# Patient Record
Sex: FEMALE | Race: BLACK OR AFRICAN AMERICAN | NOT HISPANIC OR LATINO | Employment: STUDENT | URBAN - METROPOLITAN AREA
[De-identification: names, ages, dates, MRNs, and addresses within clinical notes are randomized per-mention and may not be internally consistent; named-entity substitution may affect disease eponyms.]

---
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
## 2022-03-11 ENCOUNTER — OFFICE VISIT (OUTPATIENT)
Dept: INTERNAL MEDICINE | Facility: CLINIC | Age: 22
End: 2022-03-11
Payer: COMMERCIAL

## 2022-03-11 VITALS
OXYGEN SATURATION: 100 % | DIASTOLIC BLOOD PRESSURE: 71 MMHG | HEART RATE: 89 BPM | BODY MASS INDEX: 21.47 KG/M2 | HEIGHT: 66 IN | TEMPERATURE: 98 F | WEIGHT: 133.63 LBS | SYSTOLIC BLOOD PRESSURE: 119 MMHG

## 2022-03-11 DIAGNOSIS — R05.9 COUGH: Primary | ICD-10-CM

## 2022-03-11 DIAGNOSIS — J32.9 SINUSITIS, UNSPECIFIED CHRONICITY, UNSPECIFIED LOCATION: ICD-10-CM

## 2022-03-11 PROCEDURE — 3074F PR MOST RECENT SYSTOLIC BLOOD PRESSURE < 130 MM HG: ICD-10-PCS | Mod: CPTII,S$GLB,, | Performed by: FAMILY MEDICINE

## 2022-03-11 PROCEDURE — 3078F DIAST BP <80 MM HG: CPT | Mod: CPTII,S$GLB,, | Performed by: FAMILY MEDICINE

## 2022-03-11 PROCEDURE — 1159F PR MEDICATION LIST DOCUMENTED IN MEDICAL RECORD: ICD-10-PCS | Mod: CPTII,S$GLB,, | Performed by: FAMILY MEDICINE

## 2022-03-11 PROCEDURE — 99213 PR OFFICE/OUTPT VISIT, EST, LEVL III, 20-29 MIN: ICD-10-PCS | Mod: S$GLB,,, | Performed by: FAMILY MEDICINE

## 2022-03-11 PROCEDURE — 99999 PR PBB SHADOW E&M-EST. PATIENT-LVL IV: CPT | Mod: PBBFAC,,, | Performed by: FAMILY MEDICINE

## 2022-03-11 PROCEDURE — 3008F PR BODY MASS INDEX (BMI) DOCUMENTED: ICD-10-PCS | Mod: CPTII,S$GLB,, | Performed by: FAMILY MEDICINE

## 2022-03-11 PROCEDURE — 3074F SYST BP LT 130 MM HG: CPT | Mod: CPTII,S$GLB,, | Performed by: FAMILY MEDICINE

## 2022-03-11 PROCEDURE — 99999 PR PBB SHADOW E&M-EST. PATIENT-LVL IV: ICD-10-PCS | Mod: PBBFAC,,, | Performed by: FAMILY MEDICINE

## 2022-03-11 PROCEDURE — 3078F PR MOST RECENT DIASTOLIC BLOOD PRESSURE < 80 MM HG: ICD-10-PCS | Mod: CPTII,S$GLB,, | Performed by: FAMILY MEDICINE

## 2022-03-11 PROCEDURE — 1159F MED LIST DOCD IN RCRD: CPT | Mod: CPTII,S$GLB,, | Performed by: FAMILY MEDICINE

## 2022-03-11 PROCEDURE — 3008F BODY MASS INDEX DOCD: CPT | Mod: CPTII,S$GLB,, | Performed by: FAMILY MEDICINE

## 2022-03-11 PROCEDURE — 99213 OFFICE O/P EST LOW 20 MIN: CPT | Mod: S$GLB,,, | Performed by: FAMILY MEDICINE

## 2022-03-11 RX ORDER — CODEINE PHOSPHATE AND GUAIFENESIN 10; 100 MG/5ML; MG/5ML
5 SOLUTION ORAL 3 TIMES DAILY PRN
Qty: 120 ML | Refills: 0 | Status: SHIPPED | OUTPATIENT
Start: 2022-03-11 | End: 2022-03-21

## 2022-03-11 RX ORDER — BENZONATATE 200 MG/1
200 CAPSULE ORAL 3 TIMES DAILY PRN
Qty: 30 CAPSULE | Refills: 1 | Status: SHIPPED | OUTPATIENT
Start: 2022-03-11 | End: 2022-03-21

## 2022-03-11 RX ORDER — AMOXICILLIN AND CLAVULANATE POTASSIUM 875; 125 MG/1; MG/1
1 TABLET, FILM COATED ORAL EVERY 12 HOURS
Qty: 20 TABLET | Refills: 0 | Status: SHIPPED | OUTPATIENT
Start: 2022-03-11 | End: 2022-08-05

## 2022-03-12 NOTE — PROGRESS NOTES
"Subjective:      Patient ID: Anson Guan is a 22 y.o. female.    Chief Complaint: Cough and Nasal Congestion      Patient reports dry cough, nasal congestion , pnd, throat irritation for about 9 days now. Did have negative covid test.     Cough  Associated symptoms include postnasal drip and rhinorrhea. Pertinent negatives include no fever.     Review of Systems   Constitutional: Negative for activity change, appetite change and fever.   HENT: Positive for congestion, postnasal drip, rhinorrhea and sinus pressure.    Respiratory: Positive for cough.      History reviewed. No pertinent past medical history.       History reviewed. No pertinent surgical history.  Family History   Problem Relation Age of Onset    No Known Problems Mother     Hypertension Father     Hypertension Maternal Grandmother     Hypertension Maternal Grandfather      Social History     Socioeconomic History    Marital status: Single   Tobacco Use    Smoking status: Never Smoker    Smokeless tobacco: Never Used   Substance and Sexual Activity    Alcohol use: Yes     Alcohol/week: 1.0 standard drink     Types: 1 Glasses of wine per week    Drug use: Never    Sexual activity: Not Currently     Review of patient's allergies indicates:  No Known Allergies    Objective:       /71 (BP Location: Left arm, Patient Position: Sitting, BP Method: Large (Automatic))   Pulse 89   Temp 98.1 °F (36.7 °C) (Tympanic)   Ht 5' 6" (1.676 m)   Wt 60.6 kg (133 lb 9.6 oz)   SpO2 100%   BMI 21.56 kg/m²   Physical Exam  Vitals and nursing note reviewed.   Constitutional:       General: She is not in acute distress.     Appearance: She is well-developed. She is not diaphoretic.   HENT:      Head: Normocephalic.      Right Ear: Hearing, tympanic membrane, ear canal and external ear normal.      Left Ear: Hearing, tympanic membrane, ear canal and external ear normal.      Nose: Mucosal edema present.      Right Sinus: Maxillary sinus tenderness and " frontal sinus tenderness present.      Left Sinus: Maxillary sinus tenderness and frontal sinus tenderness present.      Mouth/Throat:      Pharynx: Uvula midline. Posterior oropharyngeal erythema present.   Eyes:      Conjunctiva/sclera: Conjunctivae normal.      Pupils: Pupils are equal, round, and reactive to light.   Cardiovascular:      Rate and Rhythm: Normal rate and regular rhythm.      Heart sounds: Normal heart sounds.   Pulmonary:      Effort: Pulmonary effort is normal. No respiratory distress.      Breath sounds: Normal breath sounds.   Abdominal:      General: Bowel sounds are normal.      Palpations: Abdomen is soft.   Lymphadenopathy:      Cervical: No cervical adenopathy.   Skin:     General: Skin is warm and dry.      Capillary Refill: Capillary refill takes less than 2 seconds.   Psychiatric:         Behavior: Behavior normal.       Assessment:     1. Cough    2. Sinusitis, unspecified chronicity, unspecified location      Plan:   Cough    Sinusitis, unspecified chronicity, unspecified location    Other orders  -     amoxicillin-clavulanate 875-125mg (AUGMENTIN) 875-125 mg per tablet; Take 1 tablet by mouth every 12 (twelve) hours.  Dispense: 20 tablet; Refill: 0  -     benzonatate (TESSALON) 200 MG capsule; Take 1 capsule (200 mg total) by mouth 3 (three) times daily as needed.  Dispense: 30 capsule; Refill: 1  -     guaiFENesin-codeine 100-10 mg/5 ml (TUSSI-ORGANIDIN NR)  mg/5 mL syrup; Take 5 mLs by mouth 3 (three) times daily as needed for Cough.  Dispense: 120 mL; Refill: 0      Medication List with Changes/Refills   New Medications    AMOXICILLIN-CLAVULANATE 875-125MG (AUGMENTIN) 875-125 MG PER TABLET    Take 1 tablet by mouth every 12 (twelve) hours.    BENZONATATE (TESSALON) 200 MG CAPSULE    Take 1 capsule (200 mg total) by mouth 3 (three) times daily as needed.    GUAIFENESIN-CODEINE 100-10 MG/5 ML (TUSSI-ORGANIDIN NR)  MG/5 ML SYRUP    Take 5 mLs by mouth 3 (three) times  daily as needed for Cough.   Current Medications    CLINDAMYCIN PHOSPHATE 1% (CLINDAGEL) 1 % GEL    clindamycin 1 % topical gel   PAULA THINLY TO THE FACE QAM FOR SPOT TREATMENT PRN    FLUTICASONE PROPIONATE (FLONASE) 50 MCG/ACTUATION NASAL SPRAY    50 sprays by Each Nostril route daily as needed.    HYDROCORTISONE 2.5 % OINTMENT    APPLY AT LOWER CHEEKS TWICE DAILY ON TUESDAY AND THURSDAY ONLY AS NEEDED FOR FLARES    TRETINOIN (RETIN-A) 0.025 % CREAM    tretinoin 0.025 % topical cream   WASH FACE AND APPLY THIN LAYER TO FACE QHS. SKIP A NIGHT IF FACE TOO DRY.   Discontinued Medications    NYSTATIN (MYCOSTATIN) CREAM    Apply topically 2 (two) times daily.

## 2022-08-05 ENCOUNTER — OFFICE VISIT (OUTPATIENT)
Dept: INTERNAL MEDICINE | Facility: CLINIC | Age: 22
End: 2022-08-05
Payer: COMMERCIAL

## 2022-08-05 VITALS
TEMPERATURE: 98 F | HEIGHT: 66 IN | RESPIRATION RATE: 14 BRPM | SYSTOLIC BLOOD PRESSURE: 118 MMHG | HEART RATE: 75 BPM | OXYGEN SATURATION: 100 % | DIASTOLIC BLOOD PRESSURE: 60 MMHG | WEIGHT: 136.25 LBS | BODY MASS INDEX: 21.9 KG/M2

## 2022-08-05 DIAGNOSIS — B35.3 TINEA PEDIS OF RIGHT FOOT: ICD-10-CM

## 2022-08-05 DIAGNOSIS — K21.9 GASTROESOPHAGEAL REFLUX DISEASE, UNSPECIFIED WHETHER ESOPHAGITIS PRESENT: ICD-10-CM

## 2022-08-05 DIAGNOSIS — Z23 NEED FOR MENINGITIS VACCINATION: Primary | ICD-10-CM

## 2022-08-05 PROCEDURE — 99999 PR PBB SHADOW E&M-EST. PATIENT-LVL IV: ICD-10-PCS | Mod: PBBFAC,,, | Performed by: FAMILY MEDICINE

## 2022-08-05 PROCEDURE — 3008F PR BODY MASS INDEX (BMI) DOCUMENTED: ICD-10-PCS | Mod: CPTII,S$GLB,, | Performed by: FAMILY MEDICINE

## 2022-08-05 PROCEDURE — 90471 IMMUNIZATION ADMIN: CPT | Mod: S$GLB,,, | Performed by: FAMILY MEDICINE

## 2022-08-05 PROCEDURE — 99999 PR PBB SHADOW E&M-EST. PATIENT-LVL IV: CPT | Mod: PBBFAC,,, | Performed by: FAMILY MEDICINE

## 2022-08-05 PROCEDURE — 3074F PR MOST RECENT SYSTOLIC BLOOD PRESSURE < 130 MM HG: ICD-10-PCS | Mod: CPTII,S$GLB,, | Performed by: FAMILY MEDICINE

## 2022-08-05 PROCEDURE — 90620 MENINGOCOCCAL B, OMV VACCINE: ICD-10-PCS | Mod: S$GLB,,, | Performed by: FAMILY MEDICINE

## 2022-08-05 PROCEDURE — 1159F MED LIST DOCD IN RCRD: CPT | Mod: CPTII,S$GLB,, | Performed by: FAMILY MEDICINE

## 2022-08-05 PROCEDURE — 90471 MENINGOCOCCAL B, OMV VACCINE: ICD-10-PCS | Mod: S$GLB,,, | Performed by: FAMILY MEDICINE

## 2022-08-05 PROCEDURE — 99214 PR OFFICE/OUTPT VISIT, EST, LEVL IV, 30-39 MIN: ICD-10-PCS | Mod: 25,S$GLB,, | Performed by: FAMILY MEDICINE

## 2022-08-05 PROCEDURE — 3078F DIAST BP <80 MM HG: CPT | Mod: CPTII,S$GLB,, | Performed by: FAMILY MEDICINE

## 2022-08-05 PROCEDURE — 90620 MENB-4C VACCINE IM: CPT | Mod: S$GLB,,, | Performed by: FAMILY MEDICINE

## 2022-08-05 PROCEDURE — 99214 OFFICE O/P EST MOD 30 MIN: CPT | Mod: 25,S$GLB,, | Performed by: FAMILY MEDICINE

## 2022-08-05 PROCEDURE — 3074F SYST BP LT 130 MM HG: CPT | Mod: CPTII,S$GLB,, | Performed by: FAMILY MEDICINE

## 2022-08-05 PROCEDURE — 3008F BODY MASS INDEX DOCD: CPT | Mod: CPTII,S$GLB,, | Performed by: FAMILY MEDICINE

## 2022-08-05 PROCEDURE — 1159F PR MEDICATION LIST DOCUMENTED IN MEDICAL RECORD: ICD-10-PCS | Mod: CPTII,S$GLB,, | Performed by: FAMILY MEDICINE

## 2022-08-05 PROCEDURE — 3078F PR MOST RECENT DIASTOLIC BLOOD PRESSURE < 80 MM HG: ICD-10-PCS | Mod: CPTII,S$GLB,, | Performed by: FAMILY MEDICINE

## 2022-08-05 RX ORDER — PANTOPRAZOLE SODIUM 40 MG/1
40 TABLET, DELAYED RELEASE ORAL DAILY
Qty: 30 TABLET | Refills: 11 | Status: SHIPPED | OUTPATIENT
Start: 2022-08-05 | End: 2022-12-27

## 2022-08-05 RX ORDER — NYSTATIN 100000 U/G
CREAM TOPICAL 2 TIMES DAILY
Qty: 30 G | Refills: 5 | Status: SHIPPED | OUTPATIENT
Start: 2022-08-05 | End: 2022-12-27

## 2022-08-05 NOTE — PROGRESS NOTES
. Administered Meningococcal B Vaccine into right deltoid. Patient tolerated well, no adverse reaction noted. Advise 15 min wait. Pt voiced understanding.

## 2022-08-07 NOTE — PROGRESS NOTES
"Subjective:      Patient ID: Anson Guan is a 22 y.o. female.    Chief Complaint: Follow-up      Patient here for vaccine update - needs second bexsero.  Reports tinea on right heel had almost resolved but then returned when she ran out of her prescription.   Dentist told her her teeth show signs of acid reflux    Follow-up  The current episode started yesterday. Pertinent negatives include no abdominal pain or nausea.     Review of Systems   Constitutional: Negative for activity change and appetite change.   Cardiovascular: Negative for leg swelling.   Gastrointestinal: Negative for abdominal pain and nausea.   Skin: Negative for color change.     No past medical history on file.       No past surgical history on file.  Family History   Problem Relation Age of Onset    No Known Problems Mother     Hypertension Father     Hypertension Maternal Grandmother     Hypertension Maternal Grandfather      Social History     Socioeconomic History    Marital status: Single   Tobacco Use    Smoking status: Never Smoker    Smokeless tobacco: Never Used   Substance and Sexual Activity    Alcohol use: Yes     Alcohol/week: 1.0 standard drink     Types: 1 Glasses of wine per week    Drug use: Never    Sexual activity: Not Currently     Review of patient's allergies indicates:  No Known Allergies    Objective:       /60 (BP Location: Right arm, Patient Position: Sitting, BP Method: Medium (Automatic))   Pulse 75   Temp 98.1 °F (36.7 °C) (Temporal)   Resp 14   Ht 5' 6" (1.676 m)   Wt 61.8 kg (136 lb 3.9 oz)   SpO2 100%   BMI 21.99 kg/m²   Physical Exam  Constitutional:       General: She is not in acute distress.     Appearance: Normal appearance. She is well-developed. She is not ill-appearing or diaphoretic.   Cardiovascular:      Rate and Rhythm: Normal rate and regular rhythm.      Heart sounds: Normal heart sounds.   Pulmonary:      Effort: Pulmonary effort is normal.      Breath sounds: Normal breath " sounds.   Skin:     Comments: Cracked , peeling skin on right heel   Neurological:      Mental Status: She is alert and oriented to person, place, and time.   Psychiatric:         Mood and Affect: Mood normal.         Behavior: Behavior normal.         Thought Content: Thought content normal.         Judgment: Judgment normal.       Assessment:     1. Need for meningitis vaccination    2. Tinea pedis of right foot    3. Gastroesophageal reflux disease, unspecified whether esophagitis present      Plan:   Need for meningitis vaccination    Tinea pedis of right foot    Gastroesophageal reflux disease, unspecified whether esophagitis present  -     pantoprazole (PROTONIX) 40 MG tablet; Take 1 tablet (40 mg total) by mouth once daily.  Dispense: 30 tablet; Refill: 11    Other orders  -     (In Office Administered) Meningococcal B, OMV Vaccine (BEXSERO)  -     nystatin (MYCOSTATIN) cream; Apply topically 2 (two) times daily.  Dispense: 30 g; Refill: 5      Medication List with Changes/Refills   New Medications    PANTOPRAZOLE (PROTONIX) 40 MG TABLET    Take 1 tablet (40 mg total) by mouth once daily.   Current Medications    CLINDAMYCIN PHOSPHATE 1% (CLINDAGEL) 1 % GEL    clindamycin 1 % topical gel   PAULA THINLY TO THE FACE QAM FOR SPOT TREATMENT PRN    FLUTICASONE PROPIONATE (FLONASE) 50 MCG/ACTUATION NASAL SPRAY    50 sprays by Each Nostril route daily as needed.    HYDROCORTISONE 2.5 % OINTMENT    APPLY AT LOWER CHEEKS TWICE DAILY ON TUESDAY AND THURSDAY ONLY AS NEEDED FOR FLARES    TRETINOIN (RETIN-A) 0.025 % CREAM    tretinoin 0.025 % topical cream   WASH FACE AND APPLY THIN LAYER TO FACE QHS. SKIP A NIGHT IF FACE TOO DRY.   Changed and/or Refilled Medications    Modified Medication Previous Medication    NYSTATIN (MYCOSTATIN) CREAM nystatin (MYCOSTATIN) cream       Apply topically 2 (two) times daily.    Apply topically 2 (two) times daily.   Discontinued Medications    AMOXICILLIN-CLAVULANATE 875-125MG (AUGMENTIN)  875-125 MG PER TABLET    Take 1 tablet by mouth every 12 (twelve) hours.

## 2022-12-21 ENCOUNTER — CLINICAL SUPPORT (OUTPATIENT)
Dept: INTERNAL MEDICINE | Facility: CLINIC | Age: 22
End: 2022-12-21
Payer: COMMERCIAL

## 2022-12-21 DIAGNOSIS — Z02.0 SCHOOL PHYSICAL EXAM: Primary | ICD-10-CM

## 2022-12-21 DIAGNOSIS — Z00.00 HEALTH CARE MAINTENANCE: Primary | ICD-10-CM

## 2022-12-21 PROCEDURE — 99999 PR PBB SHADOW E&M-EST. PATIENT-LVL II: CPT | Mod: PBBFAC,,,

## 2022-12-21 PROCEDURE — 86580 TB INTRADERMAL TEST: CPT | Mod: S$GLB,,, | Performed by: FAMILY MEDICINE

## 2022-12-21 PROCEDURE — 86580 POCT TB SKIN TEST: ICD-10-PCS | Mod: S$GLB,,, | Performed by: FAMILY MEDICINE

## 2022-12-21 PROCEDURE — 99999 PR PBB SHADOW E&M-EST. PATIENT-LVL II: ICD-10-PCS | Mod: PBBFAC,,,

## 2022-12-21 NOTE — PROGRESS NOTES
PPD 0.1 ml given in left forearm   Lot #: [id number]   Exp: 12/30/23  Manufactory: J[employer]AN  NDC #: 60739-713-73    Pt waited 15 minutes without a reaction notices and advices to return back to clinic for reading on 12/23/22  Pt verbalized understanding     Patient spoke with physician in regards to his concerns

## 2022-12-23 ENCOUNTER — CLINICAL SUPPORT (OUTPATIENT)
Dept: INTERNAL MEDICINE | Facility: CLINIC | Age: 22
End: 2022-12-23
Payer: COMMERCIAL

## 2022-12-23 DIAGNOSIS — Z00.00 HEALTH CARE MAINTENANCE: Primary | ICD-10-CM

## 2022-12-27 ENCOUNTER — OFFICE VISIT (OUTPATIENT)
Dept: INTERNAL MEDICINE | Facility: CLINIC | Age: 22
End: 2022-12-27
Payer: COMMERCIAL

## 2022-12-27 DIAGNOSIS — Z00.00 ROUTINE GENERAL MEDICAL EXAMINATION AT A HEALTH CARE FACILITY: Primary | ICD-10-CM

## 2022-12-27 DIAGNOSIS — L70.0 ACNE VULGARIS: ICD-10-CM

## 2022-12-27 PROCEDURE — 99395 PREV VISIT EST AGE 18-39: CPT | Mod: S$GLB,,, | Performed by: INTERNAL MEDICINE

## 2022-12-27 PROCEDURE — 99999 PR PBB SHADOW E&M-EST. PATIENT-LVL II: CPT | Mod: PBBFAC,,, | Performed by: INTERNAL MEDICINE

## 2022-12-27 PROCEDURE — 99395 PR PREVENTIVE VISIT,EST,18-39: ICD-10-PCS | Mod: S$GLB,,, | Performed by: INTERNAL MEDICINE

## 2022-12-27 PROCEDURE — 1159F PR MEDICATION LIST DOCUMENTED IN MEDICAL RECORD: ICD-10-PCS | Mod: CPTII,S$GLB,, | Performed by: INTERNAL MEDICINE

## 2022-12-27 PROCEDURE — 1159F MED LIST DOCD IN RCRD: CPT | Mod: CPTII,S$GLB,, | Performed by: INTERNAL MEDICINE

## 2022-12-27 PROCEDURE — 99999 PR PBB SHADOW E&M-EST. PATIENT-LVL II: ICD-10-PCS | Mod: PBBFAC,,, | Performed by: INTERNAL MEDICINE

## 2022-12-27 RX ORDER — CLINDAMYCIN PHOSPHATE 10 MG/G
GEL TOPICAL
Qty: 30 G | Refills: 1 | Status: SHIPPED | OUTPATIENT
Start: 2022-12-27 | End: 2023-02-27 | Stop reason: SDUPTHER

## 2022-12-27 RX ORDER — TRETINOIN 0.25 MG/G
CREAM TOPICAL
Qty: 45 G | Refills: 0 | Status: SHIPPED | OUTPATIENT
Start: 2022-12-27

## 2022-12-27 NOTE — PROGRESS NOTES
HPI:  Patient is a 22-year-old female who comes in today needing updated immunizations and her annual physical.  She is requesting refills of her acne meds.  She has no new problems or complaints.    Current MEDS: medcard review, verified and update  Allergies: Per the electronic medical record    History reviewed. No pertinent past medical history.    History reviewed. No pertinent surgical history.    SHx: per the electronic medical record    FHx: recorded in the electronic medical record    ROS:    denies any chest pains or shortness of breath. Denies any nausea, vomiting or diarrhea. Denies any fever, chills or sweats. Denies any change in weight, voice, stool, skin or hair. Denies any dysuria, dyspepsia or dysphagia. Denies any change in vision, hearing or headaches. Denies any swollen lymph nodes or loss of memory.    PE:  There were no vitals taken for this visit.  Gen: Well-developed, well-nourished, female, in no acute distress, oriented x3  HEENT: neck is supple, no adenopathy, carotids 2+ equal without bruits, thyroid exam normal size without nodules.  CHEST: clear to auscultation and percussion  CVS: regular rate and rhythm without significant murmur, gallop, or rubs  ABD: soft, benign, no rebound no guarding, no distention. Bowel sounds are normal.     Nontender,  no palpable masses, no organomegaly and no audible bruits.  BREAST:  Deferred.  EXT: no clubbing, cyanosis, or edema  LYMPH: no cervical, inguinal, or axillary adenopathy  FEET: no loss of sensation.  No ulcers or pressure sores.  NEURO: gait normal.  Cranial nerves II- XII intact. No nystagmus.  Speech normal.   Gross motor and sensory unremarkable.  PELVIC: deferred    Lab Results   Component Value Date    WBC 7.49 06/08/2021    HGB 13.3 06/08/2021    HCT 43.3 06/08/2021     06/08/2021    CHOL 186 06/08/2021    TRIG 75 06/08/2021    HDL 51 06/08/2021    ALT 11 06/08/2021    AST 17 06/08/2021     06/08/2021    K 4.5 06/08/2021      06/08/2021    CREATININE 1.1 06/08/2021    BUN 20 06/08/2021    CO2 23 06/08/2021       Impression:  Healthy 22-year-old female    Plan:   Orders Placed This Encounter    (In Office Administered) Tdap Vaccine    clindamycin phosphate 1% (CLINDAGEL) 1 % gel    tretinoin (RETIN-A) 0.025 % cream     Patient will he Tdap vaccine.  She will be given refills of the clindamycin and Retin-A associations were updated.    This note is generated with speech recognition software and is subject to transcription error and sound alike phrases that may be missed by proofreading.

## 2022-12-28 ENCOUNTER — TELEPHONE (OUTPATIENT)
Dept: INTERNAL MEDICINE | Facility: CLINIC | Age: 22
End: 2022-12-28
Payer: COMMERCIAL

## 2022-12-28 NOTE — TELEPHONE ENCOUNTER
----- Message from Makenzie Strange sent at 12/27/2022  2:50 PM CST -----  Contact: [phone]  Patient is trying to make sure she can get her TDAP 12/28/22.Please call patient to advise [phone].Thanks

## 2022-12-30 ENCOUNTER — CLINICAL SUPPORT (OUTPATIENT)
Dept: INTERNAL MEDICINE | Facility: CLINIC | Age: 22
End: 2022-12-30
Payer: COMMERCIAL

## 2022-12-30 PROCEDURE — 99999 PR PBB SHADOW E&M-EST. PATIENT-LVL II: ICD-10-PCS | Mod: PBBFAC,,,

## 2022-12-30 PROCEDURE — 90715 TDAP VACCINE GREATER THAN OR EQUAL TO 7YO IM: ICD-10-PCS | Mod: S$GLB,,, | Performed by: INTERNAL MEDICINE

## 2022-12-30 PROCEDURE — 90471 TDAP VACCINE GREATER THAN OR EQUAL TO 7YO IM: ICD-10-PCS | Mod: S$GLB,,, | Performed by: INTERNAL MEDICINE

## 2022-12-30 PROCEDURE — 90471 IMMUNIZATION ADMIN: CPT | Mod: S$GLB,,, | Performed by: INTERNAL MEDICINE

## 2022-12-30 PROCEDURE — 90715 TDAP VACCINE 7 YRS/> IM: CPT | Mod: S$GLB,,, | Performed by: INTERNAL MEDICINE

## 2022-12-30 PROCEDURE — 99999 PR PBB SHADOW E&M-EST. PATIENT-LVL II: CPT | Mod: PBBFAC,,,

## 2022-12-30 NOTE — PROGRESS NOTES
Tdap 0.5 ml given IM in left arm   Lot #: [id number]     Exp : 04/14/24  Manufactory : [employer]  NDC #: 82296-535-76    Pt waited 15 minutes without a reaction notices

## 2023-01-12 ENCOUNTER — TELEPHONE (OUTPATIENT)
Dept: INTERNAL MEDICINE | Facility: CLINIC | Age: 23
End: 2023-01-12

## 2023-01-25 ENCOUNTER — PATIENT MESSAGE (OUTPATIENT)
Dept: ADMINISTRATIVE | Facility: HOSPITAL | Age: 23
End: 2023-01-25
Payer: COMMERCIAL

## 2023-02-27 ENCOUNTER — PATIENT MESSAGE (OUTPATIENT)
Dept: INTERNAL MEDICINE | Facility: CLINIC | Age: 23
End: 2023-02-27
Payer: COMMERCIAL

## 2023-02-27 DIAGNOSIS — L70.0 ACNE VULGARIS: ICD-10-CM

## 2023-02-27 RX ORDER — CLINDAMYCIN PHOSPHATE 10 MG/G
GEL TOPICAL
Qty: 30 G | Refills: 1 | Status: SHIPPED | OUTPATIENT
Start: 2023-02-27 | End: 2024-03-18 | Stop reason: SDUPTHER

## 2023-02-27 RX ORDER — HYDROCORTISONE 25 MG/G
OINTMENT TOPICAL 2 TIMES DAILY
Qty: 60 G | Refills: 3 | Status: SHIPPED | OUTPATIENT
Start: 2023-02-27

## 2023-02-27 NOTE — TELEPHONE ENCOUNTER
Please see patient's mychart message and advise. Pt requesting refills on her clindamycin phosphate and hydrocortisone.     /27/2022 with   08/05/2022 with Dr.Brignac COOK none scheduled  LF 12/27/2022

## 2023-02-27 NOTE — TELEPHONE ENCOUNTER
No new care gaps identified.  [facility] Embedded Care Gaps. Reference number: [id number]. 2/27/2023   10:20:06 AM CST

## 2023-04-19 ENCOUNTER — E-VISIT (OUTPATIENT)
Dept: INTERNAL MEDICINE | Facility: CLINIC | Age: 23
End: 2023-04-19
Payer: COMMERCIAL

## 2023-04-19 DIAGNOSIS — J06.9 UPPER RESPIRATORY TRACT INFECTION, UNSPECIFIED TYPE: Primary | ICD-10-CM

## 2023-04-19 PROCEDURE — 99421 OL DIG E/M SVC 5-10 MIN: CPT | Mod: 95,,, | Performed by: FAMILY MEDICINE

## 2023-04-19 PROCEDURE — 99421 PR E&M, ONLINE DIGIT, EST, < 7 DAYS, 5-10 MINS: ICD-10-PCS | Mod: 95,,, | Performed by: FAMILY MEDICINE

## 2023-04-20 RX ORDER — PREDNISONE 20 MG/1
40 TABLET ORAL DAILY
Qty: 8 TABLET | Refills: 0 | Status: SHIPPED | OUTPATIENT
Start: 2023-04-20 | End: 2023-04-24

## 2023-04-20 RX ORDER — BENZONATATE 200 MG/1
200 CAPSULE ORAL 3 TIMES DAILY PRN
Qty: 30 CAPSULE | Refills: 1 | Status: SHIPPED | OUTPATIENT
Start: 2023-04-20 | End: 2023-04-30

## 2023-04-20 RX ORDER — AZELASTINE 1 MG/ML
1 SPRAY, METERED NASAL 2 TIMES DAILY
Qty: 30 ML | Refills: 1 | Status: SHIPPED | OUTPATIENT
Start: 2023-04-20 | End: 2024-04-19

## 2023-04-20 NOTE — PROGRESS NOTES
Patient ID: Anson Guan is a 23 y.o. female.    Chief Complaint: Nasal Congestion    The patient initiated a request through [employer] on 4/19/2023 for evaluation and management with a chief complaint of Nasal Congestion     I evaluated the questionnaire submission on 4/20/23.    [facility] Peq Evisit Upper Respitatory/Cough Questionnaire    4/19/2023  3:12 PM CDT - Filed by Patient   Do you agree to participate in an E-Visit? Yes   If you have any of the following symptoms, please present to your local ER or call 911:  I acknowledge   What is the main issue that you would like for your doctor to address today? Cough & congestion. Little pressure in my face. When laying down, I am the most congested. Babysat a toddler over break who had a cough & slept in bed with me, symptoms started about 2 days after that.I have a history of sinus allergies.   Are you able to take your vital signs? No   Are you currently pregnant, could you be pregnant, or are you breast feeding? None of the above   What symptoms do you currently have?  Cough;  Nasal Congestion   Have you had a fever? No   When did your symptoms first appear? 4/16/2023   In the last two weeks, have you been in close contact with someone who has COVID-19 or the Flu? No   In the last two weeks, have you worked or volunteered in a healthcare facility or as a ? Healthcare facilities include a hospital, medical or dental clinic, long-term care facility, or nursing home No   Do you live in a long-term care facility, nursing home, group home, or homeless shelter? No   List what you have done or taken to help your symptoms. Took [facility] cold & flu once but I have been studying for final exams, I didn't want to be drowsy from meds   How severe are your symptoms? Mild   Have you taken an at home Covid test? No   Have you taken a Flu test? No   Have you been fully vaccinated for COVID? (2 Pfizer, 2 Moderna or 1 Frantz & Frantz vaccine injections) Yes    Have you received a booster? Yes   Have you recieved a Flu shot? Yes   When did you recieve your Flu shot? 12/29/2022   Do you have transportation to get tested for COVID if it is indicated and ordered for you at an [facility] location? Yes   Provide any information you feel is important to your history not asked above    Please attach any relevant images or files          Recent Labs Obtained:  No visits with results within 7 Day(s) from this visit.   Latest known visit with results is:   Lab Visit on 12/10/2021   Component Date Value Ref Range Status    Rubella IgG Antibodies 12/10/2021 28.2  >=10.0 IU/mL Final    Rubella Immune Status 12/10/2021 Reactive   Final    Comment: 0.0-4.9 IU/mL  Nonreactive (Non-Immune)  5.0-9.9 IU/mL  Indeterminate  10.0-400.0 IU/mL Reactive (Immune)    These results were obtained with the IMMULITE 2000 Rubella  Quantitative IgG assay. Values obtained from other   manufacturers' assay methods may not be used interchangeably.       Rubeola IgG 12/10/2021 2.41 (H)  0.00 - 0.90 ISR Final    Rubeola Interpretation 12/10/2021 Positive (A)  Negative Final    Comment: <or=0.90    Negative         No detectable IgG antibody to Rubeola/Measles by the MARITZA   test. Such individuals are presumed to be uninfected with   rubeola and to be susceptible to primary infection.    0.91-1.09   Equivocal    >or=1.10    Positive   Indicates presence of detectable IgG antibody to   Rubeola/Measles by the MARITZA test. Indicative of current or   previous infection.      Mumps IgG Screen 12/10/2021 2.82 (H)  0.00 - 0.90 ISR Final    Mumps IgG Interpretation 12/10/2021 Positive (A)  Negative Final    Comment: <or=0.90     Negative            No detectable IgG antibody to Mumps by the MARITZA test. Such   individuals are presumed to be uninfected with Mumps and to be   susceptible to primary infection.    0.91-1.09    Equivocal    >or=1.10     Positive            Indicates presence of detectable IgG antibody to Mumps  by   the MARITZA test. Indicative of current or previous infection.      Hep. B Surf Ab, Qual 12/10/2021 Negative   Final    Comment: Expected Values  Unvaccinated:    Negative  Vaccinated:      Positive      Test performed at [facility],  [address] , [place]  [address]     [phone]  Tristan Velez MD  - Medical Director      Hep. B Surf Ab, Quant. 12/10/2021 <3  mIU/mL Final    Comment: Expected Values:  Unvaccinated:     Less than 10 mIU/mL  Vaccinated:       Greater than or equal to 10 mIU/mL      Varicella Zoster IgG 12/10/2021 2.70 (H)  0.00 - 0.90 ISR Final    Varicella Interpretation 12/10/2021 Positive (A)  Negative Final    Comment: <or=0.8    Negative        No detectable IgG antibody to Varicella zoster  by the MARITZA test. Such individuals are presumed to be   uninfected with Varicella zoster and to be susceptible to   primary infection.    0.9-1.0    Equivocal    >or=1.1    Positive        Indicates presence of detectable IgG antibody to Varicella   zoster by the MARITZA test. Indicative of previous or current   infection.         Encounter Diagnosis   Name Primary?    Upper respiratory tract infection, unspecified type Yes        No orders of the defined types were placed in this encounter.     Medications Ordered This Encounter   Medications    azelastine (ASTELIN) 137 mcg (0.1 %) nasal spray     Si spray (137 mcg total) by Nasal route 2 (two) times daily.     Dispense:  30 mL     Refill:  1    benzonatate (TESSALON) 200 MG capsule     Sig: Take 1 capsule (200 mg total) by mouth 3 (three) times daily as needed for Cough.     Dispense:  30 capsule     Refill:  1    predniSONE (DELTASONE) 20 MG tablet     Sig: Take 2 tablets (40 mg total) by mouth once daily. for 4 days     Dispense:  8 tablet     Refill:  0        No follow-ups on file.      E-Visit Time Trackin minutes

## 2023-08-17 ENCOUNTER — PATIENT MESSAGE (OUTPATIENT)
Dept: INTERNAL MEDICINE | Facility: CLINIC | Age: 23
End: 2023-08-17
Payer: COMMERCIAL

## 2023-09-20 ENCOUNTER — TELEPHONE (OUTPATIENT)
Dept: INTERNAL MEDICINE | Facility: CLINIC | Age: 23
End: 2023-09-20
Payer: COMMERCIAL

## 2023-09-20 NOTE — TELEPHONE ENCOUNTER
----- Message from Ahsan Bernard sent at 9/20/2023  8:36 AM CDT -----  Contact: Anson  Type:  Needs Medical Advice    Who Called: Anson  Symptoms (please be specific): possible Strep rash   How long has patient had these symptoms:  2 days  Pharmacy name and phone #:    [id number] - [place], [place] - [address] [place] [address]  Phone: [phone] Fax: [phone]  Would the patient rather a call back or a response via [facility]? call  Best Call Back Number: [phone]  Additional Information: Patient reports going to urgent care 2 days ago and wanted to know if there is possible a cream that can be prescribed for possible strep rash.  Thank you,  TH

## 2024-03-18 DIAGNOSIS — L70.0 ACNE VULGARIS: ICD-10-CM

## 2024-03-19 RX ORDER — CLINDAMYCIN PHOSPHATE 10 MG/G
GEL TOPICAL
Qty: 30 G | Refills: 0 | Status: SHIPPED | OUTPATIENT
Start: 2024-03-19

## 2024-08-07 ENCOUNTER — TELEPHONE (OUTPATIENT)
Dept: INTERNAL MEDICINE | Facility: CLINIC | Age: 24
End: 2024-08-07
Payer: COMMERCIAL

## 2025-01-30 ENCOUNTER — OFFICE VISIT (OUTPATIENT)
Dept: INTERNAL MEDICINE | Facility: CLINIC | Age: 25
End: 2025-01-30
Payer: COMMERCIAL

## 2025-01-30 VITALS
WEIGHT: 157.44 LBS | BODY MASS INDEX: 25.41 KG/M2 | DIASTOLIC BLOOD PRESSURE: 66 MMHG | TEMPERATURE: 98 F | SYSTOLIC BLOOD PRESSURE: 118 MMHG | HEART RATE: 98 BPM | OXYGEN SATURATION: 99 %

## 2025-01-30 DIAGNOSIS — J06.9 UPPER RESPIRATORY TRACT INFECTION, UNSPECIFIED TYPE: Primary | ICD-10-CM

## 2025-01-30 LAB
CTP QC/QA: YES
MOLECULAR STREP A: NEGATIVE
POC MOLECULAR INFLUENZA A AGN: NEGATIVE
POC MOLECULAR INFLUENZA B AGN: NEGATIVE
SARS-COV-2 RDRP RESP QL NAA+PROBE: NEGATIVE

## 2025-01-30 PROCEDURE — 1159F MED LIST DOCD IN RCRD: CPT | Mod: CPTII,S$GLB,,

## 2025-01-30 PROCEDURE — 3078F DIAST BP <80 MM HG: CPT | Mod: CPTII,S$GLB,,

## 2025-01-30 PROCEDURE — 87651 STREP A DNA AMP PROBE: CPT | Mod: QW,S$GLB,,

## 2025-01-30 PROCEDURE — 87502 INFLUENZA DNA AMP PROBE: CPT | Mod: QW,S$GLB,,

## 2025-01-30 PROCEDURE — 3074F SYST BP LT 130 MM HG: CPT | Mod: CPTII,S$GLB,,

## 2025-01-30 PROCEDURE — 3008F BODY MASS INDEX DOCD: CPT | Mod: CPTII,S$GLB,,

## 2025-01-30 PROCEDURE — 87635 SARS-COV-2 COVID-19 AMP PRB: CPT | Mod: QW,S$GLB,,

## 2025-01-30 PROCEDURE — 99213 OFFICE O/P EST LOW 20 MIN: CPT | Mod: S$GLB,,,

## 2025-01-30 PROCEDURE — 99999 PR PBB SHADOW E&M-EST. PATIENT-LVL III: CPT | Mod: PBBFAC,,,

## 2025-01-31 NOTE — PROGRESS NOTES
Patient ID: Anson Guan is a 25 y.o. female.    Chief Complaint: Sore Throat    History of Present Illness    CHIEF COMPLAINT:  - Ms. Guan, who is pregnant, presents with sore throat and nasal congestion, seeking evaluation to rule out strep throat.    HPI:  Ms. Guan reports nasal congestion for approximately 2 days, primarily noticeable at nighttime. A sore or scratchy throat started the night before the visit. Ms. Guan has heartburn, which she describes as GERD (Gastroesophageal Reflux Disease). She does not take any medication for the heartburn. Ms. Guan is concerned about the possibility of having strep throat, given her pregnant status.      ROS:  General: -fever, -chills, -fatigue, -weight gain, -weight loss  Eyes: -vision changes, -redness, -discharge  ENT: -ear pain, +nasal congestion, +sore throat  Cardiovascular: -chest pain, -palpitations, -lower extremity edema  Respiratory: -cough, -shortness of breath  Gastrointestinal: -abdominal pain, -nausea, -vomiting, -diarrhea, -constipation, -blood in stool, +heartburn  Genitourinary: -dysuria, -hematuria, -frequency  Musculoskeletal: -joint pain, -muscle pain  Skin: -rash, -lesion  Neurological: -headache, -dizziness, -numbness, -tingling  Psychiatric: -anxiety, -depression, -sleep difficulty         Pmh, Psh, Family Hx, Social Hx updated in Epic Tabs today.         8/5/2022    10:16 AM   Depression Patient Health Questionnaire   Over the last two weeks how often have you been bothered by little interest or pleasure in doing things Not at all   Over the last two weeks how often have you been bothered by feeling down, depressed or hopeless Not at all   PHQ-2 Total Score 0       There are no active problems to display for this patient.      No past medical history on file.    No past surgical history on file.    Family History   Problem Relation Name Age of Onset    Hypertension Maternal Grandmother      Hypertension Maternal Grandfather       Hypertension Father         Social History     Socioeconomic History    Marital status: Single   Tobacco Use    Smoking status: Never    Smokeless tobacco: Never   Substance and Sexual Activity    Alcohol use: Not Currently     Alcohol/week: 1.0 standard drink of alcohol     Types: 1 Glasses of wine per week    Drug use: Never    Sexual activity: Yes       Current Outpatient Medications on File Prior to Visit   Medication Sig Dispense Refill    ferrous sulfate (FEOSOL) 325 mg (65 mg iron) Tab tablet Take 1 tablet (325 mg total) by mouth once daily. 30 tablet 9    prenatal vit no.180/iron/folic (PRENATAL PLUS VITAMIN-MINERAL ORAL) Take 1 tablet by mouth once daily.       No current facility-administered medications on file prior to visit.       Review of patient's allergies indicates:  No Known Allergies    General - Well developed, alert and oriented in NAD  HEENT - normocephalic, no evidence of trauma, sclera white, EOMI  Neck - full range of motion  COR - regular rate and rhythm without murmurs or gallops  Lungs - Clear  Abdomen - soft, non-tender  Ext - no cyanosis or edema     Assessment:     1. Upper respiratory tract infection, unspecified type        Pertinent Labs:    Chemistry        Component Value Date/Time     06/08/2021 1650    K 4.5 06/08/2021 1650     06/08/2021 1650    CO2 23 06/08/2021 1650    BUN 20 06/08/2021 1650    CREATININE 1.1 06/08/2021 1650    GLU 99 06/08/2021 1650        Component Value Date/Time    CALCIUM 10.1 06/08/2021 1650    ALKPHOS 55 06/08/2021 1650    AST 17 06/08/2021 1650    ALT 11 06/08/2021 1650    BILITOT 0.2 06/08/2021 1650    ESTGFRAFRICA >60.0 06/08/2021 1650    EGFRNONAA >60.0 06/08/2021 1650          Lab Results   Component Value Date    WBC 8.57 12/31/2024    HGB 11.6 (L) 12/31/2024    HCT 35.2 (L) 12/31/2024    MCV 90 12/31/2024    MCH 29.7 12/31/2024    MCHC 33.0 12/31/2024    RDW 13.0 12/31/2024     12/31/2024    MPV 10.0 12/31/2024       Lab  "Results   Component Value Date    GLU 99 06/08/2021     Lab Results   Component Value Date    LDLCALC 120.0 06/08/2021     No results found for: "TSH"  Lab Results   Component Value Date    CHOL 186 06/08/2021     Lab Results   Component Value Date    TRIG 75 06/08/2021     Lab Results   Component Value Date    HDL 51 06/08/2021     Lab Results   Component Value Date    LDLCALC 120.0 06/08/2021     No results found for: "NONHDLC"  Lab Results   Component Value Date    CHOLHDL 27.4 06/08/2021       The ASCVD Risk score (Joselin HERNANDEZ, et al., 2019) failed to calculate for the following reasons:    The 2019 ASCVD risk score is only valid for ages 40 to 79    Plan:   Anson was seen today for sore throat.    Diagnoses and all orders for this visit:    Upper respiratory tract infection, unspecified type  -     POCT Strep A, Molecular  -     POCT Influenza A/B Molecular  -     POCT COVID-19 Rapid Screening        Assessment & Plan      J00 Acute nasopharyngitis [common cold]      J00 ACUTE NASOPHARYNGITIS [COMMON COLD]:  - Suspected viral upper respiratory infection due to recent prevalence of similar cases.  - Ms. Guan reports sore throat, nasal congestion, and scratchy throat for 2 days.  - Ordered strep, flu, and COVID tests to rule out bacterial infection and guide treatment.  - Explained that postnasal drip from cold congestion can cause scratchy throat.  - Discussed that viral infections like flu and COVID typically resolve on their own without specific treatment.  - Recommend performing steam inhalation for congestion relief and gargling with warm water for sore throat.  - Advised conservative management due to pregnancy.  - Instructed to follow up if needed based on test results.          1. Upper respiratory tract infection, unspecified type  - POCT Strep A, Molecular negative  - POCT Influenza A/B Molecular negative  - POCT COVID-19 Rapid Screening negative  Lack of antibiotic effectiveness discussed with her. " Symptomatic therapy suggested: gargle for sore throat, use mist at bedside for congestion.  Apply facial warm packs for sinus pain.        Immunization History   Administered Date(s) Administered    COVID-19, MRNA, LN-S, PF (Pfizer) (Purple Cap) 03/10/2021, 03/29/2021, 12/02/2021    COVID-19, mRNA, LNP-S, bivalent booster, PF (PFIZER OMICRON) 11/15/2022    DTP 2002, 2002, 2002    DTaP 08/28/2003, 04/11/2006    HIB 2002, 08/28/2003    HPV 9-Valent 01/29/2020, 03/04/2020, 06/08/2021    Hepatitis A, Pediatric/Adolescent, 2 Dose 04/11/2006, 02/26/2015    Hepatitis B 2002, 2002, 2002, 08/28/2003    Hepatitis B (recombinant) Adjuvanted, 2 dose 12/21/2021    Hib-HbOC 2002, 2002    IPV 08/28/2003, 04/11/2006    Influenza - Quadrivalent - MDCK - PF 10/02/2023    Influenza - Quadrivalent - PF *Preferred* (6 months and older) 12/08/2021, 11/15/2022    Influenza - Trivalent - Flucelvax - PF 11/06/2024    MMR 01/14/2003, 04/11/2006    Meningococcal B, OMV 12/10/2021, 08/05/2022    Meningococcal Conjugate (MCV4P) 01/08/2013, 07/31/2018    OPV 2002, 2002    PPD Test 12/10/2021, 12/21/2022, 08/07/2024    Pneumococcal Conjugate - 7 Valent 2002, 01/14/2003, 08/28/2003    Tdap 01/08/2013, 12/30/2022, 12/31/2024    Varicella 01/14/2003, 04/11/2006, 03/07/2007       Orders Placed This Encounter   Procedures    POCT Strep A, Molecular    POCT Influenza A/B Molecular    POCT COVID-19 Rapid Screening       Portions of this note were generated by Deep[employer].    Each patient to whom medical services by telemedicine are provided:  (1) informed of the relationship between the physician and patient and the respective role of any other health care provider with respect to management of the patient; and (2) notified that he or she may decline to receive medical services by telemedicine and may withdraw from such care at any time.    I spent a total of 25 minutes face to face  and non-face to face on the date of this visit.This includes time preparing to see the patient (eg, review of tests, notes), obtaining and/or reviewing additional history from an independent historian and/or outside medical records, documenting clinical information in the electronic health record, independently interpreting results and/or communicating results to the patient/family/caregiver, or care coordinator.  Visit today included increased complexity associated with the care of the episodic problem addressed and managing the longitudinal care of the patient due to the serious and/or complex managed problem(s).      This note was generated with the assistance of ambient listening technology. Verbal consent was obtained by the patient and accompanying visitor(s) for the recording of patient appointment to facilitate this note. I attest to having reviewed and edited the generated note for accuracy, though some syntax or spelling errors may persist. Please contact the author of this note for any clarification.      Cecily Agosto MD

## 2025-06-09 ENCOUNTER — OFFICE VISIT (OUTPATIENT)
Dept: DERMATOLOGY | Facility: CLINIC | Age: 25
End: 2025-06-09
Payer: COMMERCIAL

## 2025-06-09 DIAGNOSIS — L70.0 ACNE COMEDONE: ICD-10-CM

## 2025-06-09 DIAGNOSIS — L81.9 DYSCHROMIA: ICD-10-CM

## 2025-06-09 DIAGNOSIS — L70.0 ACNE VULGARIS: Primary | ICD-10-CM

## 2025-06-09 PROCEDURE — G2211 COMPLEX E/M VISIT ADD ON: HCPCS | Mod: S$GLB,,, | Performed by: DERMATOLOGY

## 2025-06-09 PROCEDURE — 99204 OFFICE O/P NEW MOD 45 MIN: CPT | Mod: S$GLB,,, | Performed by: DERMATOLOGY

## 2025-06-09 PROCEDURE — 1160F RVW MEDS BY RX/DR IN RCRD: CPT | Mod: CPTII,S$GLB,, | Performed by: DERMATOLOGY

## 2025-06-09 PROCEDURE — 99999 PR PBB SHADOW E&M-EST. PATIENT-LVL II: CPT | Mod: PBBFAC,,, | Performed by: DERMATOLOGY

## 2025-06-09 PROCEDURE — 1159F MED LIST DOCD IN RCRD: CPT | Mod: CPTII,S$GLB,, | Performed by: DERMATOLOGY

## 2025-06-09 RX ORDER — HYDROQUINONE 40 MG/G
CREAM TOPICAL
Qty: 28 G | Refills: 1 | Status: SHIPPED | OUTPATIENT
Start: 2025-06-09

## 2025-06-09 RX ORDER — TRETINOIN 0.25 MG/G
CREAM TOPICAL
Qty: 20 G | Refills: 6 | Status: SHIPPED | OUTPATIENT
Start: 2025-06-09 | End: 2026-06-09

## 2025-06-09 NOTE — PROGRESS NOTES
History of Present Illness: The patient presents with chief complaint of acne.  Location: face  Duration: 1 year  Signs/Symptoms: pimples    Prior treatments:  Tretinoin, clindamycin , hydrocortisone

## 2025-06-09 NOTE — PROGRESS NOTES
Subjective:      Patient ID:  Anson Guan is a 25 y.o. female who presents for No chief complaint on file.    Hx of acne and dry skin    History of Present Illness: The patient presents with chief complaint of acne.  Location: face  Duration: 1 year  Signs/Symptoms: pimples    Prior treatments:  Tretinoin, clindamycin, hydrocortisone, la roche posay c/ BP (burning), cetaphil moisturizer, u to the people cleanser, pan-oxyl,  abdon sunscreen          Review of Systems   Constitutional:  Negative for fever and chills.   Gastrointestinal:  Negative for nausea and vomiting.   Skin:  Positive for activity-related sunscreen use. Negative for daily sunscreen use and recent sunburn.   Hematologic/Lymphatic: Does not bruise/bleed easily.       Objective:   Physical Exam   Constitutional: She appears well-developed and well-nourished. No distress.   Neurological: She is alert and oriented to person, place, and time. She is not disoriented.   Psychiatric: She has a normal mood and affect.   Skin:   Areas Examined (abnormalities noted in diagram):   Head / Face Inspection Performed  Neck Inspection Performed  RUE Inspected  LUE Inspection Performed  Nails and Digits Inspection Performed            Diagram Legend     Open and closed comedones      Inflammatory papules and pustules       Assessment / Plan:        Acne vulgaris  Acne comedone  Dyschromia  -     hydroquinone 4 % Crea; Apply to dark spots once daily. Use with sunscreen if outdoors  Dispense: 28 g; Refill: 1  -     tretinoin (RETIN-A) 0.025 % cream; Apply pea-sized amount to entire face at bedtime.  If dryness, use every third night and increase as tolerated to every night.  Dispense: 20 g; Refill: 6  -     mild comedonal acne c/ mild dyshcromia. Will start above med with HQ 8%. Discussed oily cleansers, continue  lauren sunscreen.     We notified patient of common potential side effects such as dryness, redness, peeling, or mild skin irritation. The  patient was counseled to use a pea-sized amount prior to bedtime on the entire face. Start medication every other night until the patient develops tolerance, then increase to nightly use. The patient was encouraged to use gentle emollients in conjunction with this medication and temporarily hold medication if severe skin irritation occurs. Pregnancy and breastfeeding must be avoided on retinoids. Patient voiced understanding and allowed to ask questions           Follow up in about 4 months (around 10/9/2025).

## 2025-08-13 ENCOUNTER — PATIENT MESSAGE (OUTPATIENT)
Dept: DERMATOLOGY | Facility: CLINIC | Age: 25
End: 2025-08-13
Payer: COMMERCIAL

## 2025-08-14 DIAGNOSIS — L70.0 ACNE COMEDONE: ICD-10-CM

## 2025-08-14 DIAGNOSIS — L70.0 ACNE VULGARIS: Primary | ICD-10-CM

## 2025-08-14 RX ORDER — TRETINOIN 0.5 MG/G
CREAM TOPICAL
Qty: 20 G | Refills: 6 | Status: SHIPPED | OUTPATIENT
Start: 2025-08-14 | End: 2026-08-14